# Patient Record
Sex: FEMALE
[De-identification: names, ages, dates, MRNs, and addresses within clinical notes are randomized per-mention and may not be internally consistent; named-entity substitution may affect disease eponyms.]

---

## 2017-06-08 ENCOUNTER — HOSPITAL ENCOUNTER (INPATIENT)
Dept: HOSPITAL 82 - OB | Age: 32
LOS: 6 days | Discharge: HOME | End: 2017-06-14
Attending: OBSTETRICS & GYNECOLOGY | Admitting: OBSTETRICS & GYNECOLOGY
Payer: COMMERCIAL

## 2017-06-08 VITALS — WEIGHT: 145 LBS | BODY MASS INDEX: 31.28 KG/M2 | HEIGHT: 57 IN

## 2017-06-08 DIAGNOSIS — Z3A.39: ICD-10-CM

## 2017-06-08 DIAGNOSIS — O34.211: Primary | ICD-10-CM

## 2017-06-08 DIAGNOSIS — N73.6: ICD-10-CM

## 2017-06-08 DIAGNOSIS — O99.89: ICD-10-CM

## 2017-06-08 DIAGNOSIS — N85.8: ICD-10-CM

## 2017-06-12 VITALS — SYSTOLIC BLOOD PRESSURE: 100 MMHG | DIASTOLIC BLOOD PRESSURE: 58 MMHG

## 2017-06-12 VITALS — DIASTOLIC BLOOD PRESSURE: 83 MMHG | SYSTOLIC BLOOD PRESSURE: 138 MMHG

## 2017-06-12 VITALS — SYSTOLIC BLOOD PRESSURE: 110 MMHG | DIASTOLIC BLOOD PRESSURE: 49 MMHG

## 2017-06-12 VITALS — SYSTOLIC BLOOD PRESSURE: 99 MMHG | DIASTOLIC BLOOD PRESSURE: 61 MMHG

## 2017-06-12 VITALS — DIASTOLIC BLOOD PRESSURE: 56 MMHG | SYSTOLIC BLOOD PRESSURE: 103 MMHG

## 2017-06-12 VITALS — SYSTOLIC BLOOD PRESSURE: 118 MMHG | DIASTOLIC BLOOD PRESSURE: 77 MMHG

## 2017-06-12 VITALS — SYSTOLIC BLOOD PRESSURE: 95 MMHG | DIASTOLIC BLOOD PRESSURE: 51 MMHG

## 2017-06-12 VITALS — SYSTOLIC BLOOD PRESSURE: 108 MMHG | DIASTOLIC BLOOD PRESSURE: 54 MMHG

## 2017-06-12 VITALS — SYSTOLIC BLOOD PRESSURE: 95 MMHG | DIASTOLIC BLOOD PRESSURE: 50 MMHG

## 2017-06-12 VITALS — SYSTOLIC BLOOD PRESSURE: 108 MMHG | DIASTOLIC BLOOD PRESSURE: 58 MMHG

## 2017-06-12 VITALS — SYSTOLIC BLOOD PRESSURE: 102 MMHG | DIASTOLIC BLOOD PRESSURE: 54 MMHG

## 2017-06-12 VITALS — DIASTOLIC BLOOD PRESSURE: 63 MMHG | SYSTOLIC BLOOD PRESSURE: 102 MMHG

## 2017-06-12 VITALS — SYSTOLIC BLOOD PRESSURE: 138 MMHG | DIASTOLIC BLOOD PRESSURE: 83 MMHG

## 2017-06-12 VITALS — DIASTOLIC BLOOD PRESSURE: 54 MMHG | SYSTOLIC BLOOD PRESSURE: 102 MMHG

## 2017-06-12 LAB
BASOPHILS NFR BLD AUTO: 0 % (ref 0–3)
EOSINOPHIL NFR BLD AUTO: 1 % (ref 0–8)
ERYTHROCYTE [DISTWIDTH] IN BLOOD BY AUTOMATED COUNT: 14.2 % (ref 11.5–15.5)
HCT VFR BLD AUTO: 34.3 % (ref 37–47)
HGB BLD-MCNC: 11.9 G/DL (ref 12–16)
IMM GRANULOCYTES NFR BLD: 0.3 % (ref 0–1)
LYMPHOCYTES NFR BLD: 27 % (ref 15–41)
MCH RBC QN AUTO: 31.1 PG  CALC (ref 26–32)
MCHC RBC AUTO-ENTMCNC: 34.7 G/L CALC (ref 32–36)
MCV RBC AUTO: 89.6 FL  CALC (ref 80–100)
MONOCYTES NFR BLD AUTO: 6 % (ref 2–13)
NEUTROPHILS # BLD AUTO: 4.38 THOU/UL (ref 2–7.15)
NEUTROPHILS NFR BLD AUTO: 66 % (ref 42–76)
PLATELET # BLD AUTO: 123 THOU/UL (ref 130–400)
RBC # BLD AUTO: 3.83 MILL/UL (ref 4.2–5.6)

## 2017-06-12 PROCEDURE — 0UB70ZZ EXCISION OF BILATERAL FALLOPIAN TUBES, OPEN APPROACH: ICD-10-PCS | Performed by: OBSTETRICS & GYNECOLOGY

## 2017-06-12 PROCEDURE — 0DNW0ZZ RELEASE PERITONEUM, OPEN APPROACH: ICD-10-PCS | Performed by: OBSTETRICS & GYNECOLOGY

## 2017-06-12 PROCEDURE — 0UN90ZZ RELEASE UTERUS, OPEN APPROACH: ICD-10-PCS | Performed by: OBSTETRICS & GYNECOLOGY

## 2017-06-12 NOTE — NUR
ASSISTED TO RESTROOM TO VOID. GAIT STEADY AND EVEN. VS TAKEN AND STABLE. IV
INFUSING AT 125CC/HR PER MED PUMP. FETAL MONITOR PLACED ON PT. PT READY FOR
SURGERY.

## 2017-06-12 NOTE — NUR
PT READY FOR SLEEP. IV INFUSING PER MED PUMP AT 125CC/HR. IV SITE WNL. HAS NO
QUESTIONS OR COMPLAINTS. PARTNER AND CHILDREN LEAVE. CALL LIGHT WITHIN REACH
AND BED IN LOW POSITION.

## 2017-06-12 NOTE — NUR
VS STABLE. PT HAS NO QUESTIONS OR CONCERNS. ENCOURAGED TO TAKE SOME ORAL
FLUIDS BUT REFUSES. NO NEW DRAINAGE NOTED ON ABDOMINAL DRESSING. PT VERY
TIRED. WILL LET HER SLEEP.

## 2017-06-12 NOTE — NUR
SCDS IN PLACE, PT ENC TO DEEP BREATHE. AWARE SHE CAN HAVE PAIN MED IF NEEDED.
RATES PAIN AT "2".  CALL BELL W/IN REACH.

## 2017-06-12 NOTE — NUR
PT HAS BEEN USING INCENTIVE SPIROMETER. EDWIN CARE DONE, DRESSING UNCHANG
SINCE 1230 TODAY.  PT ENC TO GET OOB, PT SITS ON SIDE OF BED, WITHOUT
PROBLEM, AMBULATED TO BATHROOM, FELT SLIGHTLY DIZZY, BACK TO BED, SITTING ON
SIDE OF BED, EATING SMALL AMT FULL LIQUIDS. COBB DRAINS JAMAL URINE.

## 2017-06-12 NOTE — NUR
PT TO UNIT VIA STRETCHER, AWAKE, ALERT, MOVING LEGS, FEET. SETTLED IN BED.
EDWIN CARE DONE, COBB DRAINS PALE YELLOW URINE, IV INFUSES W/O PROBLEM.

## 2017-06-12 NOTE — NUR
COMES TO UNIT FOR REPEAT  IN AM WITH BTL. ACCOMPANIED BY
SIGNIFICANT OTHER AND TWO CHILDREN. PT SPEAKS [place] BUT PARTNER SPEAKS
ENGLISH. FETAL MONITOR APPLIED. MONITOR SHOWS CONTRACTIONS BUT PT STATES SHE
DOES NOT FEEL THEM. IV STARTED BY LEXII MCINTOSH RN IN RT FOREARM.

## 2017-06-12 NOTE — NUR
1230 PT VOMITED 100 ML CLEAR LIQUID, GIVEN ZOFRAN.  PT ENC TO WAIT TO DRINK
ANY FLUIDS.  LG AMT BLOOD ON GREEN PAD, NO ACTIVE BLEEDING, EDWIN CARE DONE.
1240 PT SAT UP TO CARE FOR INFANT
GOT NAUSEOUS. VOMITE 10 ML, CLEAR, ENC TO LIE
DOWN AND REST, S/O IN ROOM HELPING TO CARE FOR INFANT.

## 2017-06-12 NOTE — NUR
REPORT RECEIVED FROM ANGIE PARSON RN. PT SITTING IN BED HOLDING BABY. PT HAS
IV OF LR WITH PITOCIN INFUSING AT 125CC/HR PER MED PUMP.  COBB CATHETER
PATENT AND DRAINING JAMAL COLORED URINE. INCISIONAL DRESSING HAS OLD DRAINAGE
NOTED IN LOWER RT QUADRANT, MARKED BY DAY SHIFT. NO NEW DRAINAGE NOTED. EB HATCH INTERPRETS FOR THIS NURSE.

## 2017-06-13 VITALS — DIASTOLIC BLOOD PRESSURE: 61 MMHG | SYSTOLIC BLOOD PRESSURE: 109 MMHG

## 2017-06-13 VITALS — DIASTOLIC BLOOD PRESSURE: 53 MMHG | SYSTOLIC BLOOD PRESSURE: 98 MMHG

## 2017-06-13 VITALS — SYSTOLIC BLOOD PRESSURE: 110 MMHG | DIASTOLIC BLOOD PRESSURE: 71 MMHG

## 2017-06-13 LAB
BASOPHILS NFR BLD AUTO: 0 % (ref 0–3)
EOSINOPHIL NFR BLD AUTO: 0 % (ref 0–8)
ERYTHROCYTE [DISTWIDTH] IN BLOOD BY AUTOMATED COUNT: 14.3 % (ref 11.5–15.5)
HCT VFR BLD AUTO: 24.2 % (ref 37–47)
HGB BLD-MCNC: 8.1 G/DL (ref 12–16)
IMM GRANULOCYTES NFR BLD: 1.4 % (ref 0–1)
LYMPHOCYTES NFR BLD: 17 % (ref 15–41)
MCH RBC QN AUTO: 30.7 PG  CALC (ref 26–32)
MCHC RBC AUTO-ENTMCNC: 33.5 G/L CALC (ref 32–36)
MCV RBC AUTO: 91.7 FL  CALC (ref 80–100)
MONOCYTES NFR BLD AUTO: 7 % (ref 2–13)
NEUTROPHILS # BLD AUTO: 4.65 THOU/UL (ref 2–7.15)
NEUTROPHILS NFR BLD AUTO: 74 % (ref 42–76)
PLATELET # BLD AUTO: 87 THOU/UL (ref 130–400)
RBC # BLD AUTO: 2.64 MILL/UL (ref 4.2–5.6)

## 2017-06-13 NOTE — NUR
REPORT RECEIVED FROM PREVIOUS SHIFT. PT SITTING UP IN BED HOLDING INFANT.
DRESSING DRY AND INTACT. STATES PAIN 1-2/10.  S/O AND CHILDREN AT BEDSIDE.
DENIES OTHER NEEDS AT THIS TIME.  BED IN LOW POSITION, CALL LIGHT IN REACH.

## 2017-06-13 NOTE — NUR
ASSESSMENT IS COMPLETED: IV SITE IS FREE FROM REDNESS OR EDEMA.DRESSING HAS
SOME SHADOWING NOTED. BONDING WELL WITH INFANT. ABLE TO AMBULATE TO THE
BATHROOM. VOIDED WELL. CONTINUE TO PASS FLATUS. CONTINUE TO OSBERVE AND
MONITOR.

## 2017-06-13 NOTE — NUR
PT SITTING IN BEDSIDE CHAIR IN NO APPARENT DISTRESS. CBC DRAWN FROM LEFT AC
USING #23 BUTTERFLY NEEDLE X1 ATTEMPT. PT TOLERATED WELL. DENIES NEEDS.

## 2017-06-13 NOTE — NUR
REPORT RECEIVED FROM ENEDINA CHEEMA RN.  PT SITTING QUIETLY UP IN BED, AWAKE WITH
LIGHT ON.  PT DENIES PAIN. ADVISED TAKING OVER CARE.  ASKED IF I COULD
GET HER UP TO BATHROOM, STATED NO. ADVISED WOULD NEED TO GET UP TO BATHROOM IN
NEXT 1 TO 2 HOURS. IV FLUIDS RUNNING /HR.  BED IN LOW POSITION.  CALL
LIGHT IN REACH.  WILL CONTINUE TO MONITOR.

## 2017-06-13 NOTE — NUR
0557  PT ASSISTED UP TO BATHROOM. EDWIN CARE DONE, FRESH PADS APPLIED. DID NOT
URINATE.  ASSISTED TO BED AND HANDED INFANT PER REQUEST TO HOLD.
2887  TORODOL GIVEN AS REQUESTED FOR C/O PAIN.

## 2017-06-13 NOTE — NUR
IN TO SEE PATIENT.  VS/ASSESSMENT AS CHARTED.  STATES PAIN IS 1/10 AT INCISION
LINE.  ENCOURAGED PATIENT TO INCREASE FLUIDS.  BROUGHT WATER AND APPLE JUICE
PER REQUEST.  DRESSING IS CLEAN, DRY AND INTACT WITH SOME SHADOWING.  PT HAS
NOT SHOWERED TODAY.  ADVISED AFTER SHE FINISHES FEEDING INFANT, SHE CAN SHOWER
AND WET DRESSING TO TAKE OFF.  PT ASKED IF NEW DRESSING WOULD BE APPLIED AND
EXPLAINED THAT IT WOULD BE OPEN TO AIR. REITTERATED TO PATIENT ABOUT
HANDWASHING, SAFE INFANT SLEEPING, DIAPERING, USING BULB SUCTION AND WHERE TO
KEEP DIAPERS AFTER CHANGING. VOICED UNDERSTANDING.

## 2017-06-13 NOTE — NUR
PT IS RELAXING IN BED WITH INFANT NEXT TO HER NO DISTRESS NOTED. IV SITE IS
NOW HEPLOCKED WITH ROUTINE CARE. CONTINUE TO ENCOURAGE PT TO DRINK AND EAT.

## 2017-06-13 NOTE — NUR
PT IS RESTING AT THIS TIME. PT DID AMBULATE IN THE CANTU WITH INFANT IN OPEN
CRIB. NO DISTRESS NOTED. CONTINUE TO OSBERVE AND MONITOR

## 2017-06-14 VITALS — DIASTOLIC BLOOD PRESSURE: 61 MMHG | SYSTOLIC BLOOD PRESSURE: 97 MMHG

## 2017-06-14 VITALS — SYSTOLIC BLOOD PRESSURE: 107 MMHG | DIASTOLIC BLOOD PRESSURE: 64 MMHG

## 2017-06-14 NOTE — NUR
IN TO CHECK ON PATIENT.  LAYING IN BED, EYES CLOSED.  RESP EVEN/UNLABORED.
GENTLY WOKE TO ADVISE/REITTERATE INFANT NEEDS TO BE IN OPEN CRIB WHEN PT
SLEEPING. MOVED INFANT TO OPEN CRIB. ADVISED MOM WOULD TAKE TO NURSERY TO
REPEAT HEARING, FEED INFANT, PKU.

## 2017-06-14 NOTE — NUR
pt has been in the room. getting lunch and finishing up paperwork for
discharge. continue to osberve and monitor.

## 2017-06-14 NOTE — NUR
IN ROOM WITH PT, STATES SHE HAD TO USE BATHROOM.  ADVISED SHE MAY GET UP.
PT NEEDS ENCOURAGEMENT TO GET UP ON OWN, FEED INFANT, CHANGE INFANT ON HER
OWN.

## 2017-10-02 ENCOUNTER — HOSPITAL ENCOUNTER (EMERGENCY)
Dept: HOSPITAL 82 - ED | Age: 32
Discharge: HOME | DRG: 690 | End: 2017-10-02
Payer: SELF-PAY

## 2017-10-02 VITALS — WEIGHT: 123.46 LBS | BODY MASS INDEX: 26.63 KG/M2 | HEIGHT: 57 IN

## 2017-10-02 VITALS — DIASTOLIC BLOOD PRESSURE: 66 MMHG | SYSTOLIC BLOOD PRESSURE: 117 MMHG

## 2017-10-02 DIAGNOSIS — N39.0: Primary | ICD-10-CM

## 2017-10-02 DIAGNOSIS — R10.31: ICD-10-CM

## 2017-10-02 DIAGNOSIS — B96.20: ICD-10-CM

## 2017-10-02 LAB
BILIRUB UR QL STRIP.AUTO: NEGATIVE
CLARITY UR: (no result)
COLOR UR AUTO: YELLOW
GLUCOSE UR STRIP.AUTO-MCNC: NEGATIVE MG/DL
HGB UR QL STRIP.AUTO: (no result)
KETONES UR STRIP.AUTO-MCNC: NEGATIVE MG/DL
LEUKOCYTE ESTERASE UR QL STRIP.AUTO: (no result)
NITRITE UR QL STRIP.AUTO: NEGATIVE
PH UR STRIP.AUTO: 6 [PH] (ref 4.5–8)
PROT UR QL STRIP.AUTO: NEGATIVE MG/DL
RBC #/AREA URNS HPF: (no result) RBC/HPF (ref 0–5)
SP GR UR STRIP.AUTO: 1.01
SQUAMOUS URNS QL MICRO: (no result) EPI/HPF
UROBILINOGEN UR QL STRIP.AUTO: 0.2 E.U./DL

## 2018-07-08 ENCOUNTER — HOSPITAL ENCOUNTER (EMERGENCY)
Dept: HOSPITAL 82 - ED | Age: 33
Discharge: HOME | DRG: 153 | End: 2018-07-08
Payer: SELF-PAY

## 2018-07-08 VITALS — HEIGHT: 61 IN | WEIGHT: 149.91 LBS | BODY MASS INDEX: 28.3 KG/M2

## 2018-07-08 VITALS — DIASTOLIC BLOOD PRESSURE: 58 MMHG | SYSTOLIC BLOOD PRESSURE: 122 MMHG

## 2018-07-08 DIAGNOSIS — R68.84: ICD-10-CM

## 2018-07-08 DIAGNOSIS — K08.89: ICD-10-CM

## 2018-07-08 DIAGNOSIS — J02.9: Primary | ICD-10-CM
